# Patient Record
Sex: FEMALE | Race: WHITE | NOT HISPANIC OR LATINO
[De-identification: names, ages, dates, MRNs, and addresses within clinical notes are randomized per-mention and may not be internally consistent; named-entity substitution may affect disease eponyms.]

---

## 2018-12-10 ENCOUNTER — APPOINTMENT (OUTPATIENT)
Dept: PULMONOLOGY | Facility: CLINIC | Age: 78
End: 2018-12-10
Payer: MEDICARE

## 2018-12-10 VITALS
WEIGHT: 205 LBS | DIASTOLIC BLOOD PRESSURE: 82 MMHG | HEIGHT: 66 IN | OXYGEN SATURATION: 98 % | HEART RATE: 53 BPM | SYSTOLIC BLOOD PRESSURE: 151 MMHG | TEMPERATURE: 98.7 F | RESPIRATION RATE: 16 BRPM | BODY MASS INDEX: 32.95 KG/M2

## 2018-12-10 DIAGNOSIS — R06.09 OTHER FORMS OF DYSPNEA: ICD-10-CM

## 2018-12-10 PROCEDURE — 94060 EVALUATION OF WHEEZING: CPT

## 2018-12-10 PROCEDURE — 94726 PLETHYSMOGRAPHY LUNG VOLUMES: CPT

## 2018-12-10 PROCEDURE — 99204 OFFICE O/P NEW MOD 45 MIN: CPT | Mod: 25

## 2018-12-10 PROCEDURE — 94729 DIFFUSING CAPACITY: CPT

## 2018-12-10 PROCEDURE — ZZZZZ: CPT

## 2019-01-09 ENCOUNTER — FORM ENCOUNTER (OUTPATIENT)
Age: 79
End: 2019-01-09

## 2019-01-10 ENCOUNTER — OUTPATIENT (OUTPATIENT)
Dept: OUTPATIENT SERVICES | Facility: HOSPITAL | Age: 79
LOS: 1 days | End: 2019-01-10
Payer: COMMERCIAL

## 2019-01-10 ENCOUNTER — APPOINTMENT (OUTPATIENT)
Dept: CT IMAGING | Facility: CLINIC | Age: 79
End: 2019-01-10
Payer: MEDICARE

## 2019-01-10 DIAGNOSIS — Z90.710 ACQUIRED ABSENCE OF BOTH CERVIX AND UTERUS: Chronic | ICD-10-CM

## 2019-01-10 DIAGNOSIS — Z00.8 ENCOUNTER FOR OTHER GENERAL EXAMINATION: ICD-10-CM

## 2019-01-10 DIAGNOSIS — Z90.49 ACQUIRED ABSENCE OF OTHER SPECIFIED PARTS OF DIGESTIVE TRACT: Chronic | ICD-10-CM

## 2019-01-10 PROCEDURE — 71250 CT THORAX DX C-: CPT

## 2019-01-10 PROCEDURE — 71250 CT THORAX DX C-: CPT | Mod: 26

## 2020-06-30 ENCOUNTER — APPOINTMENT (OUTPATIENT)
Dept: PEDIATRIC ALLERGY IMMUNOLOGY | Facility: CLINIC | Age: 80
End: 2020-06-30
Payer: MEDICARE

## 2020-06-30 DIAGNOSIS — E03.9 HYPOTHYROIDISM, UNSPECIFIED: ICD-10-CM

## 2020-06-30 DIAGNOSIS — Z86.79 PERSONAL HISTORY OF OTHER DISEASES OF THE CIRCULATORY SYSTEM: ICD-10-CM

## 2020-06-30 DIAGNOSIS — Z86.59 PERSONAL HISTORY OF OTHER MENTAL AND BEHAVIORAL DISORDERS: ICD-10-CM

## 2020-06-30 DIAGNOSIS — Z86.69 PERSONAL HISTORY OF OTHER DISEASES OF THE NERVOUS SYSTEM AND SENSE ORGANS: ICD-10-CM

## 2020-06-30 DIAGNOSIS — L50.1 IDIOPATHIC URTICARIA: ICD-10-CM

## 2020-06-30 PROCEDURE — 99203 OFFICE O/P NEW LOW 30 MIN: CPT

## 2020-06-30 NOTE — HISTORY OF PRESENT ILLNESS
[Asthma] : asthma [Allergic Rhinitis] : allergic rhinitis [de-identified] : 79 yr old previously followed by allergist at [facility] now is seen for consult.  Pt was well until 12/19 when she noted sudden onset urticaria and angioedema.  Hives were diffuse and associated with angioedema of lips, eyes, some dysphagia and dysphonia.  She was seen in MD office and given shot of steroids, PO steroids and Benadryl. symptoms went away but over the next few months from 1/20 to 6/20 pt has noticed episodes hives and mild angioedema about 3-4x/week. Complaints appear to be somewhat random and may be associated with foods but is non consistent.  Pt went to allergist, was skin tested and found to have low grade positive skin tests to several airborne allergens but these were not associated with flares of hives.  RASTs were also done to airborne allergens, fish, shellfish, peanut, tree nuts and were essentially negative. Pt however was told to avoid nuts and fish for ?? reason.  Pt has been avoiding tomato but is not clear this was associated with any hives.  Pt has underlying history of hypothyroidism and is on replacement therapy.  Several courses of oral steroids have been required since 1/20 to control hives.  Pt has not been using chronic H1 antihistamine therapy to supress hives but just chronic H2 antihistamine therapy.

## 2020-06-30 NOTE — PHYSICAL EXAM
[Alert] : alert [No Acute Distress] : no acute distress [Normal Pupil & Iris Size/Symmetry] : normal pupil and iris size and symmetry [Normal Lips/Tongue] : the lips and tongue were normal [Normal TMs] : both tympanic membranes were normal [No Thrush] : no thrush [Normal Rate and Effort] : normal respiratory rhythm and effort [No Crackles] : no crackles [Bilateral Audible Breath Sounds] : bilateral audible breath sounds [Normal Rate] : heart rate was normal  [Normal Cervical Lymph Nodes] : cervical [Regular Rhythm] : with a regular rhythm [Skin Intact] : skin intact  [de-identified] : Vitiligo, no evidnce  of urticaria or angioedema [Normal Mood] : mood was normal

## 2020-06-30 NOTE — REVIEW OF SYSTEMS
[Chest Pain] : chest pain  or discomfort [Puffy Eyelids] : puffy ~T eyelids [Pruritus] : pruritus [Urticaria] : urticaria [Swelling] : swelling [Depression] : depression [Nl] : Hematologic/Lymphatic [de-identified] : Peg

## 2020-06-30 NOTE — ASSESSMENT
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  Although previous allergy evaluation suggested some sort of underlying atopic cause to pt hives,none could be found by history or review of previous allergy evaluation.  As in most situation, etiology is likely to be idiopathic. Pt has only been taking episodic H1 blockers and continues to flare past several months with 3 oral courses of steroids being required.   Previous use of Zyrtec and Xyzal caused sedation. Pt destin start Allegra 180 mg qd-bid as trial.  Labs sent for evaluation\par \par Inder Stout MD, FAAP, FAAAAI\par [facility] Ast[facility]ology\par [facility]\par [facility]\par [facility] [place]\par [address], [address] [facility] [address], [place]  [address]\par [address] [place] [address], [address]  [address]\par [phone]\par

## 2020-06-30 NOTE — REASON FOR VISIT
[Initial Consultation] : an initial consultation for [To Food] : allergy to food [Hives] : hives [Angioedema] : angioedema

## 2020-07-06 LAB
ALBUMIN SERPL ELPH-MCNC: 4 G/DL
ALP BLD-CCNC: 113 U/L
ALT SERPL-CCNC: 12 U/L
ANION GAP SERPL CALC-SCNC: 9 MMOL/L
AST SERPL-CCNC: 21 U/L
BASOPHILS # BLD AUTO: 0.01 K/UL
BASOPHILS NFR BLD AUTO: 0.1 %
BILIRUB SERPL-MCNC: 0.6 MG/DL
BUN SERPL-MCNC: 18 MG/DL
CALCIUM SERPL-MCNC: 8.8 MG/DL
CHLORIDE SERPL-SCNC: 108 MMOL/L
CO2 SERPL-SCNC: 25 MMOL/L
CREAT SERPL-MCNC: 0.75 MG/DL
EOSINOPHIL # BLD AUTO: 0.27 K/UL
EOSINOPHIL NFR BLD AUTO: 3.3 %
ERYTHROCYTE [SEDIMENTATION RATE] IN BLOOD BY WESTERGREN METHOD: 18 MM/HR
GLUCOSE SERPL-MCNC: 83 MG/DL
HCT VFR BLD CALC: 42.3 %
HGB BLD-MCNC: 12.7 G/DL
IMM GRANULOCYTES NFR BLD AUTO: 0.4 %
LYMPHOCYTES # BLD AUTO: 1.9 K/UL
LYMPHOCYTES NFR BLD AUTO: 23.3 %
MAN DIFF?: NORMAL
MCHC RBC-ENTMCNC: 27.4 PG
MCHC RBC-ENTMCNC: 30 GM/DL
MCV RBC AUTO: 91.2 FL
MONOCYTES # BLD AUTO: 0.68 K/UL
MONOCYTES NFR BLD AUTO: 8.4 %
NEUTROPHILS # BLD AUTO: 5.25 K/UL
NEUTROPHILS NFR BLD AUTO: 64.5 %
PLATELET # BLD AUTO: 238 K/UL
POTASSIUM SERPL-SCNC: 4.5 MMOL/L
PROT SERPL-MCNC: 5.9 G/DL
RBC # BLD: 4.64 M/UL
RBC # FLD: 13.8 %
SODIUM SERPL-SCNC: 142 MMOL/L
T4 SERPL-MCNC: 8.3 UG/DL
WBC # FLD AUTO: 8.14 K/UL

## 2020-10-06 ENCOUNTER — APPOINTMENT (OUTPATIENT)
Dept: PEDIATRIC ALLERGY IMMUNOLOGY | Facility: CLINIC | Age: 80
End: 2020-10-06
Payer: MEDICARE

## 2020-10-06 VITALS — HEART RATE: 57 BPM | OXYGEN SATURATION: 97 % | RESPIRATION RATE: 16 BRPM

## 2020-10-06 PROCEDURE — 99213 OFFICE O/P EST LOW 20 MIN: CPT

## 2020-10-06 NOTE — HISTORY OF PRESENT ILLNESS
[de-identified] : 79 yr old with chronic urticaria for the past year.  She was previously evaluated at another allergist where shin tests revealed presumptive irrelevant skin tests.  Evaluation to date had included a normal CBC, ESR,. T4, CMP.  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 Pt was seen in late June and started on Allegra 180 mg qAM along with Pepcid 20 mg qpm. She is now about 80% better but still has AM and PM hives.  She also has vitiligo and eczema/xerosis

## 2020-10-06 NOTE — ASSESSMENT
[FreeTextEntry1] : 79 yr old with CIU with significant improvement with Allegra 180 mg qd but still mild AM and PM symptoms.\par \par Suggest increasing Allegra to 180 mg bid along with Pepcid 20 mg qhs. Skin care routines were discussed. Pt has already received Influenza vaccine at primary care MD .\par \par Will follow up 4-6 months\par \par Inder Stout MD, FAAP, FAAAAI\par Pediatric [facility] Adult [facility] Ast[facility], [facility] Immunology\par [facility]\par [facility]\par [facility] at [place]\par [address], [address], [place]  [address]\par [address] [place] [address], [address], [place]  [address]\par [phone]\par

## 2020-11-30 ENCOUNTER — APPOINTMENT (OUTPATIENT)
Dept: PULMONOLOGY | Facility: CLINIC | Age: 80
End: 2020-11-30
Payer: MEDICARE

## 2020-11-30 VITALS
TEMPERATURE: 98.1 F | HEART RATE: 86 BPM | SYSTOLIC BLOOD PRESSURE: 144 MMHG | HEIGHT: 66 IN | BODY MASS INDEX: 34.55 KG/M2 | RESPIRATION RATE: 15 BRPM | OXYGEN SATURATION: 97 % | DIASTOLIC BLOOD PRESSURE: 80 MMHG | WEIGHT: 215 LBS

## 2020-11-30 PROCEDURE — 99214 OFFICE O/P EST MOD 30 MIN: CPT

## 2020-11-30 PROCEDURE — 99072 ADDL SUPL MATRL&STAF TM PHE: CPT

## 2020-11-30 NOTE — PHYSICAL EXAM
[General Appearance - Well Developed] : well developed [Normal Appearance] : normal appearance [General Appearance - Well Nourished] : well nourished [No Deformities] : no deformities [Normal Conjunctiva] : the conjunctiva exhibited no abnormalities [Low Lying Soft Palate] : low lying soft palate [Enlarged Base of the Tongue] : enlargement of the base of the tongue [III] : III [Heart Rate And Rhythm] : heart rate was normal and rhythm regular [Heart Sounds] : normal S1 and S2 [Murmurs] : no murmurs [] : no respiratory distress [Respiration, Rhythm And Depth] : normal respiratory rhythm and effort [Auscultation Breath Sounds / Voice Sounds] : lungs were clear to auscultation bilaterally [Abnormal Walk] : normal gait [Musculoskeletal - Swelling] : no joint swelling seen [Nail Clubbing] : no clubbing of the fingernails [Cyanosis, Localized] : no localized cyanosis [Skin Color & Pigmentation] : normal skin color and pigmentation [Skin Turgor] : normal skin turgor [No Focal Deficits] : no focal deficits [Oriented To Time, Place, And Person] : oriented to person, place, and time [Impaired Insight] : insight and judgment were intact [Affect] : the affect was normal [Normal Oropharynx] : abnormal oropharynx [Elongated Uvula] : no elongated uvula [Erythema] : no erythema of the pharynx [Retrognathia] : no retrognathia [Micrognathia] : no micrognathia [Neck Appearance] : the appearance of the neck was normal [FreeTextEntry2] : no LE edema

## 2020-11-30 NOTE — HISTORY OF PRESENT ILLNESS
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 She has been on CPAP since. Last visit was in 2018. After last visit, humidifier was broken and got replaced but has since broken again. Her DME supplier is [facility] and has been receiving new supplies. She is using a full face mask and tolerating without issues. She feels that she is waking up more frequently as of late. \par She has gained about 10 lbs since the last visit. \par \par No longer reporting dyspnea on exertion. Attributes her episode last visit due to stress.\par \par

## 2020-11-30 NOTE — ASSESSMENT
[FreeTextEntry1] : 77 yo female with severe KASIA on CPAP here for a follow up visit. Therapeutic and compliance data download reveals usage 100% of days with an average use of 9 hours and 24 minutes. Therapy based AHI is 0.9/hr on 14 cm H2O. The patient is experiencing benefit from CPAP and should continue to use. She feels that she is waking up more. Though AHI is within normal limits, she did have weight gain so pressure was empirically raised to 15 cm H2O. Follow up CT chest for pulmonary nodules. \par \par Follow up in 1 month.\par

## 2020-11-30 NOTE — REVIEW OF SYSTEMS
[Thyroid Disease] : thyroid disease [Difficulty Maintaining Sleep] : difficulty maintaining sleep [Negative] : Psychiatric [EDS: ESS=____] : daytime somnolence: ESS=[unfilled] [Fatigue] : no fatigue [Recent Wt Gain (___ Lbs)] : no recent weight gain [Nasal Congestion] : no nasal congestion [Snoring] : no snoring [Witnessed Apneas] : no witnessed apnea [Shortness Of Breath] : no shortness of breath [A.M. Dry Mouth] : no a.m. dry mouth [Chest Pain] : no chest pain [Obesity] : not obese [Diabetes] : no diabetes  [Anemia] : no anemia [A.M. Headache] : no headache present upon awakening [Heartburn] : no heartburn [Nocturia] : no nocturia [Difficulty Initiating Sleep] : no difficulty falling asleep [Lower Extremity Discomfort] : no lower extremity discomfort [Irresistible urge to move legs] : no irresistible urge to move legs because of lower extremity discomfort [LE discomfort relieved by movement] : lower extremity discomfort not relieved by movement [Late day/ Evening symptoms] : no late day/evening symptoms [Sleep Disturbances due to LE symptoms] : ~T no sleep disturbances due to lower extremity symptoms [Unusual Sleep Behavior] : no unusual sleep behavior

## 2020-12-18 ENCOUNTER — OUTPATIENT (OUTPATIENT)
Dept: OUTPATIENT SERVICES | Facility: HOSPITAL | Age: 80
LOS: 1 days | End: 2020-12-18
Payer: COMMERCIAL

## 2020-12-18 ENCOUNTER — APPOINTMENT (OUTPATIENT)
Dept: CT IMAGING | Facility: CLINIC | Age: 80
End: 2020-12-18
Payer: MEDICARE

## 2020-12-18 DIAGNOSIS — Z00.00 ENCOUNTER FOR GENERAL ADULT MEDICAL EXAMINATION WITHOUT ABNORMAL FINDINGS: ICD-10-CM

## 2020-12-18 DIAGNOSIS — Z90.49 ACQUIRED ABSENCE OF OTHER SPECIFIED PARTS OF DIGESTIVE TRACT: Chronic | ICD-10-CM

## 2020-12-18 DIAGNOSIS — Z90.710 ACQUIRED ABSENCE OF BOTH CERVIX AND UTERUS: Chronic | ICD-10-CM

## 2020-12-18 PROCEDURE — 71250 CT THORAX DX C-: CPT | Mod: 26

## 2020-12-18 PROCEDURE — 71250 CT THORAX DX C-: CPT

## 2020-12-30 ENCOUNTER — NON-APPOINTMENT (OUTPATIENT)
Age: 80
End: 2020-12-30

## 2021-01-04 ENCOUNTER — APPOINTMENT (OUTPATIENT)
Dept: PULMONOLOGY | Facility: CLINIC | Age: 81
End: 2021-01-04
Payer: MEDICARE

## 2021-01-04 PROCEDURE — 99214 OFFICE O/P EST MOD 30 MIN: CPT | Mod: 95

## 2021-01-04 NOTE — ASSESSMENT
[FreeTextEntry1] : 81 yo female with severe KASIA on CPAP here for a follow up visit. Therapeutic and compliance data download reveals usage 100% of days with an average use of 9 hours and 34 minutes. Therapy based AHI is 0.6/hr on 15 cm H2O. Last visit, pressure was empirically increased to 15 cm H2O but feels that it is too high. Will lower back down to 14 cm H2O. Contacted [facility] regarding supply issue. Residual sleepiness may be related to stressors related to family member's health or suboptimally controlled depression. She will follow up with PMD regarding depression. Reviewed CT chest results with patient. Can follow up CT chest in 1 year.\par \par Follow up in 1 month for repeat data download.\par

## 2021-01-04 NOTE — HISTORY OF PRESENT ILLNESS
[Home] : at home, [unfilled] , at the time of the visit. 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 She has been on CPAP since. Last visit, pressure empirically raised to 15 cm H2O but she feels this pressure is too high. She is using a full face mask and notes leak at times. DME [facility]. She has not received re-supplies in several months. She sleeps 9.5 hours but still feels tired during the day. Unsure if it's related to KASIA or stressors ongoing at home.\par \par Currently dealing with stressors at home. Daughter hospitalized last week and she also takes care of  who is legally blind. Zoloft dosing was recently adjusted by PCP.

## 2021-01-04 NOTE — REVIEW OF SYSTEMS
[EDS: ESS=____] : daytime somnolence: ESS=[unfilled] [Thyroid Disease] : thyroid disease [Depression] : depression [Difficulty Maintaining Sleep] : difficulty maintaining sleep [Negative] : Musculoskeletal [Fatigue] : no fatigue [Recent Wt Gain (___ Lbs)] : no recent weight gain [Nasal Congestion] : no nasal congestion [Snoring] : no snoring [Witnessed Apneas] : no witnessed apnea [Shortness Of Breath] : no shortness of breath [A.M. Dry Mouth] : no a.m. dry mouth [Chest Pain] : no chest pain [Obesity] : not obese [Diabetes] : no diabetes  [Anemia] : no anemia [A.M. Headache] : no headache present upon awakening [Heartburn] : no heartburn [Nocturia] : no nocturia [Difficulty Initiating Sleep] : no difficulty falling asleep [Lower Extremity Discomfort] : no lower extremity discomfort [Irresistible urge to move legs] : no irresistible urge to move legs because of lower extremity discomfort [LE discomfort relieved by movement] : lower extremity discomfort not relieved by movement [Late day/ Evening symptoms] : no late day/evening symptoms [Sleep Disturbances due to LE symptoms] : ~T no sleep disturbances due to lower extremity symptoms [Unusual Sleep Behavior] : no unusual sleep behavior

## 2021-02-09 ENCOUNTER — APPOINTMENT (OUTPATIENT)
Dept: PEDIATRIC ALLERGY IMMUNOLOGY | Facility: CLINIC | Age: 81
End: 2021-02-09

## 2021-02-10 ENCOUNTER — APPOINTMENT (OUTPATIENT)
Dept: PULMONOLOGY | Facility: CLINIC | Age: 81
End: 2021-02-10
Payer: MEDICARE

## 2021-02-10 DIAGNOSIS — G47.33 OBSTRUCTIVE SLEEP APNEA (ADULT) (PEDIATRIC): ICD-10-CM

## 2021-02-10 PROCEDURE — 99214 OFFICE O/P EST MOD 30 MIN: CPT | Mod: 95

## 2021-02-10 RX ORDER — ATORVASTATIN CALCIUM 40 MG/1
40 TABLET, FILM COATED ORAL
Qty: 90 | Refills: 0 | Status: ACTIVE | COMMUNITY
Start: 2020-09-02

## 2021-02-10 NOTE — HISTORY OF PRESENT ILLNESS
[Home] : at home, [unfilled] , at the time of the visit. 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 She has been on CPAP since. Last visit, pressure empirically lowered back down to 14 cm H2O. She is using a full face mask and notes leak at times. DME is [facility]. Resupply issue has been resolved. \par \par

## 2021-02-10 NOTE — ASSESSMENT
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 Therapeutic and compliance data download reveals usage 100% of days with an average use of 9 hours and 37 minutes. Therapy based AHI is 1/hr on 14 cm H2O.  The patient is experiencing benefit from CPAP and should continue to use. \par  \par \par Follow up in 8 months. Repeat CT chest at that time. \par

## 2021-02-22 ENCOUNTER — APPOINTMENT (OUTPATIENT)
Dept: PEDIATRIC ALLERGY IMMUNOLOGY | Facility: CLINIC | Age: 81
End: 2021-02-22
Payer: MEDICARE

## 2021-02-22 VITALS
HEART RATE: 56 BPM | DIASTOLIC BLOOD PRESSURE: 72 MMHG | WEIGHT: 200 LBS | OXYGEN SATURATION: 96 % | RESPIRATION RATE: 22 BRPM | HEIGHT: 66 IN | SYSTOLIC BLOOD PRESSURE: 168 MMHG | BODY MASS INDEX: 32.14 KG/M2

## 2021-02-22 PROCEDURE — 99213 OFFICE O/P EST LOW 20 MIN: CPT

## 2021-02-22 PROCEDURE — 99072 ADDL SUPL MATRL&STAF TM PHE: CPT

## 2021-02-22 RX ORDER — FEXOFENADINE HYDROCHLORIDE 180 MG/1
180 TABLET, FILM COATED ORAL
Refills: 0 | Status: ACTIVE | COMMUNITY

## 2021-02-22 RX ORDER — TRIAMCINOLONE ACETONIDE 1 MG/G
0.1 CREAM TOPICAL
Qty: 454 | Refills: 0 | Status: DISCONTINUED | COMMUNITY
Start: 2020-09-12 | End: 2021-02-22

## 2021-02-22 NOTE — SOCIAL HISTORY
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

## 2021-02-22 NOTE — REASON FOR VISIT
[Routine Follow-Up] : a routine follow-up visit for [Allergy Evaluation/ Skin Testing] : allergy evaluation and or skin testing [Hives] : hives

## 2021-02-22 NOTE — HISTORY OF PRESENT ILLNESS
[de-identified] : 80 yr old with CIU doing very well with hives. Over the past few months pt has tried to stop both her Allegra and Pepcid from bid to qd with increase hives after a few days. If she is on both of them bid, she is free of hives.

## 2021-02-22 NOTE — ASSESSMENT
[FreeTextEntry1] : 80 yr old with chronic urticaria doing well with meds. Pt tried to stop her H1 and H2 blockers and noted increase hives. \par OK to restart bid meds\par Will follow up 6 months\par \par

## 2021-02-22 NOTE — PHYSICAL EXAM
[Well Nourished] : well nourished [No Discharge] : no discharge [Normal TMs] : both tympanic membranes were normal [No Thrush] : no thrush [Normal Rate and Effort] : normal respiratory rhythm and effort [No Crackles] : no crackles [Wheezing] : no wheezing was heard [Normal Rate] : heart rate was normal  [Normal Cervical Lymph Nodes] : cervical [Patches] : ~M patches present [Xerosis] : xerosis [Excoriated] : excoriated [Lichenifcation] : lichenifcation

## 2021-05-13 ENCOUNTER — APPOINTMENT (OUTPATIENT)
Dept: PEDIATRIC ALLERGY IMMUNOLOGY | Facility: CLINIC | Age: 81
End: 2021-05-13
Payer: MEDICARE

## 2021-05-13 VITALS — RESPIRATION RATE: 20 BRPM | OXYGEN SATURATION: 95 % | HEART RATE: 57 BPM

## 2021-05-13 PROCEDURE — 99072 ADDL SUPL MATRL&STAF TM PHE: CPT

## 2021-05-13 PROCEDURE — 99213 OFFICE O/P EST LOW 20 MIN: CPT

## 2021-05-13 NOTE — ASSESSMENT
[FreeTextEntry1] : 80 yr old with CIU/MATILDE now with breakthrough complaints with increase stress\par with some angioedema\par \par Suggest increase Allegra 180/360 mg bid and continue Pepcid\par Pt to call next week\par \par Total MD time spent on this encounter was 20 minutes.  This includes time devoted to preparing to see the patient with review of previous medical record, obtaining medical history, performing physical exam, counseling and patient education with patient and family, ordering medications and lab studies, documentation in the medical record and coordination of care.\par

## 2021-05-13 NOTE — PHYSICAL EXAM
[Alert] : alert [Well Nourished] : well nourished [No Discharge] : no discharge [Normal TMs] : both tympanic membranes were normal [No Thrush] : no thrush [Boggy Nasal Turbinates] : no boggy and/or pale nasal turbinates [Posterior Pharyngeal Cobblestoning] : no posterior pharyngeal cobblestoning [Normal Rate and Effort] : normal respiratory rhythm and effort [No Crackles] : no crackles [Wheezing] : no wheezing was heard [Normal Rate] : heart rate was normal  [Normal S1, S2] : normal S1 and S2 [Skin Intact] : skin intact

## 2021-05-13 NOTE — HISTORY OF PRESENT ILLNESS
[de-identified] : 80 yr old with CIU was doing very well with hives on Allegra 180 mg bid and famotidine 20 mg bid. . In the past few weeks she has been under a great deal of stress with loss of family members and has noted increase hives despite taking these medications. There has been one episode of Lt poppy-orbital eye swelling and one episode of changes in vocal quality with some throat tightness but no respiratory distress.

## 2021-06-01 ENCOUNTER — NON-APPOINTMENT (OUTPATIENT)
Age: 81
End: 2021-06-01

## 2021-07-07 ENCOUNTER — APPOINTMENT (OUTPATIENT)
Dept: PEDIATRIC ALLERGY IMMUNOLOGY | Facility: CLINIC | Age: 81
End: 2021-07-07
Payer: MEDICARE

## 2021-07-07 DIAGNOSIS — I10 ESSENTIAL (PRIMARY) HYPERTENSION: ICD-10-CM

## 2021-07-07 PROCEDURE — 99214 OFFICE O/P EST MOD 30 MIN: CPT

## 2021-07-07 RX ORDER — EPINEPHRINE 0.3 MG/.3ML
0.3 INJECTION INTRAMUSCULAR
Qty: 2 | Refills: 1 | Status: ACTIVE | COMMUNITY
Start: 2021-07-07 | End: 1900-01-01

## 2021-07-07 NOTE — ASSESSMENT
[FreeTextEntry1] : 80 yr old with 1 1/2 yr history of MATILDE/CIU now with increasing complaints past few weeks after increase stress in family and ?? new BP meds given by primary care MD - pt to call me back with name of medication- If ACE inh will have to change\par \par Last eval in 2020 was normal but will do more extensive evaluation now\par \par Continue Allegra 320/180 mg bid and Pepcid 20 mg bid\par Start Prednisone 50 mg now and then 40 mg qd for 4 days and then taper\par Consider Xolair if eval is normal\par Pt to call next few days or sooner if no better\par Will give Epi Pen to carry

## 2021-07-07 NOTE — REASON FOR VISIT
[Routine Follow-Up] : a routine follow-up visit for [Hives] : hives [Angioedema] : angioedema [Family Member] : family member

## 2021-07-07 NOTE — PHYSICAL EXAM
[Alert] : alert [Well Nourished] : well nourished [No Discharge] : no discharge [Normal TMs] : both tympanic membranes were normal [Normal Nasal Mucosa] : the nasal mucosa was normal [No Thrush] : no thrush [Pale mucosa] : no pale mucosa [Boggy Nasal Turbinates] : no boggy and/or pale nasal turbinates [Pharyngeal erythema] : no pharyngeal erythema [Posterior Pharyngeal Cobblestoning] : no posterior pharyngeal cobblestoning [Clear Rhinorrhea] : no clear rhinorrhea was seen [No Neck Mass] : no neck mass was observed [Normal Rate and Effort] : normal respiratory rhythm and effort [No Crackles] : no crackles [Wheezing] : no wheezing was heard [Normal Rate] : heart rate was normal  [Normal Cervical Lymph Nodes] : cervical [de-identified] : Minimal lip swelling - upper - no tongue or uvular swelling [de-identified] : Mild angioedema of lower extremities no urticaria today

## 2021-07-07 NOTE — REVIEW OF SYSTEMS
[Puffy Eyelids] : puffy ~T eyelids [Swollen Eyelids] : ~T ~L swollen eyelids [Hoarseness] : hoarseness [Urticaria] : urticaria [Swelling] : swelling [Nl] : Respiratory [FreeTextEntry4] : Throat tightness

## 2021-07-07 NOTE — HISTORY OF PRESENT ILLNESS
[de-identified] : 80 yr old with CIU was doing very well with hives on Allegra 180 mg bid and famotidine 20 mg bid. . In the past few weeks she has been under a great deal of stress with loss of family members and has noted increase hives and now increase angioedema despite taking these medications. Past 24 hrs pt complained of bilateral poppy-orbital swelling, mild lip swelling and some throat tightens, change in vocal quality and dysphagia. No shortness of breath. \par Pt was started on new BP med but does not know its name - she will call with name - if ACE Inh - will need to stop

## 2021-07-10 LAB
ALBUMIN SERPL ELPH-MCNC: 4.2 G/DL
ALP BLD-CCNC: 113 U/L
ALT SERPL-CCNC: 13 U/L
ANION GAP SERPL CALC-SCNC: 11 MMOL/L
AST SERPL-CCNC: 17 U/L
BASOPHILS # BLD AUTO: 0.03 K/UL
BASOPHILS NFR BLD AUTO: 0.2 %
BILIRUB SERPL-MCNC: 0.2 MG/DL
BUN SERPL-MCNC: 20 MG/DL
C4 SERPL-MCNC: 40 MG/DL
CALCIUM SERPL-MCNC: 9.6 MG/DL
CHLORIDE SERPL-SCNC: 106 MMOL/L
CO2 SERPL-SCNC: 26 MMOL/L
CREAT SERPL-MCNC: 0.83 MG/DL
EOSINOPHIL # BLD AUTO: 0.06 K/UL
EOSINOPHIL NFR BLD AUTO: 0.3 %
ERYTHROCYTE [SEDIMENTATION RATE] IN BLOOD BY WESTERGREN METHOD: 12 MM/HR
GLUCOSE SERPL-MCNC: 85 MG/DL
HCT VFR BLD CALC: 43.7 %
HGB BLD-MCNC: 13.3 G/DL
IMM GRANULOCYTES NFR BLD AUTO: 0.9 %
LYMPHOCYTES # BLD AUTO: 1.55 K/UL
LYMPHOCYTES NFR BLD AUTO: 8.4 %
MAN DIFF?: NORMAL
MCHC RBC-ENTMCNC: 27.5 PG
MCHC RBC-ENTMCNC: 30.4 GM/DL
MCV RBC AUTO: 90.5 FL
MONOCYTES # BLD AUTO: 0.94 K/UL
MONOCYTES NFR BLD AUTO: 5.1 %
NEUTROPHILS # BLD AUTO: 15.75 K/UL
NEUTROPHILS NFR BLD AUTO: 85.1 %
PLATELET # BLD AUTO: 304 K/UL
POTASSIUM SERPL-SCNC: 4.4 MMOL/L
PROT SERPL-MCNC: 6.5 G/DL
RBC # BLD: 4.83 M/UL
RBC # FLD: 13 %
SODIUM SERPL-SCNC: 144 MMOL/L
T4 SERPL-MCNC: 7.9 UG/DL
THYROGLOB AB SERPL-ACNC: <20 IU/ML
THYROPEROXIDASE AB SERPL IA-ACNC: <10 IU/ML
TSH SERPL-ACNC: 3.17 UIU/ML
WBC # FLD AUTO: 18.5 K/UL

## 2021-07-14 LAB
C1INH FUNCTIONAL FLD-MCNC: >91
C1INH SERPL-MCNC: 43 MG/DL
CHRONIC URTICARIA PANEL (CU INDEX): >50
TRYPTASE: 19.5 UG/L

## 2021-07-15 LAB — IGE AB SERPL QL: 17 NG/ML

## 2021-08-01 ENCOUNTER — NON-APPOINTMENT (OUTPATIENT)
Age: 81
End: 2021-08-01

## 2021-08-01 LAB
IGE RECEPTOR AB INTERPRETATION: NORMAL
IGE RECEPTOR AB: 35.4 %
IGE RECEPTOR COMMENT: NORMAL

## 2021-08-10 ENCOUNTER — APPOINTMENT (OUTPATIENT)
Dept: PEDIATRIC ALLERGY IMMUNOLOGY | Facility: CLINIC | Age: 81
End: 2021-08-10
Payer: MEDICARE

## 2021-08-10 VITALS — TEMPERATURE: 97.2 F | BODY MASS INDEX: 36.49 KG/M2 | HEIGHT: 65 IN | WEIGHT: 219 LBS

## 2021-08-10 DIAGNOSIS — R74.8 ABNORMAL LEVELS OF OTHER SERUM ENZYMES: ICD-10-CM

## 2021-08-10 DIAGNOSIS — L85.3 XEROSIS CUTIS: ICD-10-CM

## 2021-08-10 PROCEDURE — 99213 OFFICE O/P EST LOW 20 MIN: CPT

## 2021-08-10 RX ORDER — SERTRALINE HYDROCHLORIDE 100 MG/1
100 TABLET, FILM COATED ORAL
Refills: 0 | Status: ACTIVE | COMMUNITY

## 2021-08-10 RX ORDER — PREDNISONE 10 MG/1
10 TABLET ORAL
Qty: 40 | Refills: 0 | Status: DISCONTINUED | COMMUNITY
Start: 2021-07-07 | End: 2021-08-10

## 2021-08-10 NOTE — HISTORY OF PRESENT ILLNESS
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 However Tryptase was elevate at 19.5 - pt has no other signs of mastocytosis\par \par Pt has underlying history of vitiligo\par She remains on her atorvastatin, Synthroid, amlodipine, metoprolol, sertraline

## 2021-08-10 NOTE — SOCIAL HISTORY
[Spouse/Partner] : spouse/partner [College] : College [House] : [unfilled] lives in a house  [Radiator/Baseboard] : heating provided by radiator(s)/baseboard(s) [Window Units] : air conditioning provided by window units [Dust Mite Covers] : has dust mite covers [Bedroom] :  in bedroom [Living Area] : in living area [Dog] : dog [] :  [FreeTextEntry1] : 2 years [FreeTextEntry2] : reitired [Humidifier] : does not use a humidifier [Dehumidifier] : does not use a dehumidifier [Feather Pillows] : does not have feather pillows [Feather Comforter] : does not have a feather comforter [Smokers in Household] : there are no smokers in the home [de-identified] : quilting,crocheting

## 2021-08-10 NOTE — ASSESSMENT
[FreeTextEntry1] : 80 yr old with previous history of CIU/MATILDE now much better in remission on high dose Allegra\par Will begin to taper Allegra to 180 mg bid and cut Pepcid to qd\par \par Hx of elevated Tryptase - ?? etiology-may need to send to [facility] if still persistently positive\par Will send eval with c-kit and repeat all other students. If still elevated will consider eval with 24 hr urines.

## 2021-08-10 NOTE — PHYSICAL EXAM
[Alert] : alert [Well Nourished] : well nourished [No Discharge] : no discharge [Normal TMs] : both tympanic membranes were normal [Boggy Nasal Turbinates] : no boggy and/or pale nasal turbinates [Posterior Pharyngeal Cobblestoning] : no posterior pharyngeal cobblestoning [No Neck Mass] : no neck mass was observed [Normal Rate and Effort] : normal respiratory rhythm and effort [Normal Rate] : heart rate was normal  [Normal Cervical Lymph Nodes] : cervical

## 2021-10-04 ENCOUNTER — NON-APPOINTMENT (OUTPATIENT)
Age: 81
End: 2021-10-04

## 2021-10-04 LAB
ALBUMIN MFR SERPL ELPH: 57.7 %
ALBUMIN SERPL ELPH-MCNC: 4.1 G/DL
ALBUMIN SERPL-MCNC: 3.6 G/DL
ALBUMIN/GLOB SERPL: 1.3 RATIO
ALP BLD-CCNC: 107 U/L
ALPHA1 GLOB MFR SERPL ELPH: 4.7 %
ALPHA1 GLOB SERPL ELPH-MCNC: 0.3 G/DL
ALPHA2 GLOB MFR SERPL ELPH: 11.9 %
ALPHA2 GLOB SERPL ELPH-MCNC: 0.7 G/DL
ALT SERPL-CCNC: 15 U/L
ANION GAP SERPL CALC-SCNC: 11 MMOL/L
AST SERPL-CCNC: 17 U/L
B-GLOBULIN MFR SERPL ELPH: 13.6 %
B-GLOBULIN SERPL ELPH-MCNC: 0.9 G/DL
BASOPHILS # BLD AUTO: 0.02 K/UL
BASOPHILS NFR BLD AUTO: 0.2 %
BILIRUB SERPL-MCNC: 0.3 MG/DL
BUN SERPL-MCNC: 34 MG/DL
CALCIUM SERPL-MCNC: 9.9 MG/DL
CHLORIDE SERPL-SCNC: 109 MMOL/L
CO2 SERPL-SCNC: 23 MMOL/L
CREAT SERPL-MCNC: 0.89 MG/DL
CRP SERPL-MCNC: <3 MG/L
DEPRECATED KAPPA LC FREE/LAMBDA SER: 1.27 RATIO
EOSINOPHIL # BLD AUTO: 0.18 K/UL
EOSINOPHIL NFR BLD AUTO: 1.7 %
ERYTHROCYTE [SEDIMENTATION RATE] IN BLOOD BY WESTERGREN METHOD: 28 MM/HR
GAMMA GLOB FLD ELPH-MCNC: 0.8 G/DL
GAMMA GLOB MFR SERPL ELPH: 12.1 %
GLUCOSE SERPL-MCNC: 81 MG/DL
HCT VFR BLD CALC: 43.3 %
HGB BLD-MCNC: 13.5 G/DL
IGA SER QL IEP: 186 MG/DL
IGG SER QL IEP: 714 MG/DL
IGM SER QL IEP: 77 MG/DL
IMM GRANULOCYTES NFR BLD AUTO: 0.3 %
INTERPRETATION SERPL IEP-IMP: NORMAL
KAPPA LC CSF-MCNC: 1.1 MG/DL
KAPPA LC SERPL-MCNC: 1.4 MG/DL
LYMPHOCYTES # BLD AUTO: 1.87 K/UL
LYMPHOCYTES NFR BLD AUTO: 17.5 %
MAN DIFF?: NORMAL
MCHC RBC-ENTMCNC: 28.2 PG
MCHC RBC-ENTMCNC: 31.2 GM/DL
MCV RBC AUTO: 90.4 FL
MONOCYTES # BLD AUTO: 0.77 K/UL
MONOCYTES NFR BLD AUTO: 7.2 %
NEUTROPHILS # BLD AUTO: 7.84 K/UL
NEUTROPHILS NFR BLD AUTO: 73.1 %
PLATELET # BLD AUTO: 246 K/UL
POTASSIUM SERPL-SCNC: 5.5 MMOL/L
PROT SERPL-MCNC: 6.3 G/DL
RBC # BLD: 4.79 M/UL
RBC # FLD: 14.2 %
SODIUM SERPL-SCNC: 143 MMOL/L
TRYPTASE: 10.2 UG/L
VIT B12 SERPL-MCNC: 395 PG/ML
WBC # FLD AUTO: 10.71 K/UL

## 2021-10-05 LAB — CH50 SERPL-MCNC: 92 U/ML

## 2021-10-07 ENCOUNTER — APPOINTMENT (OUTPATIENT)
Dept: PEDIATRIC ALLERGY IMMUNOLOGY | Facility: CLINIC | Age: 81
End: 2021-10-07
Payer: MEDICARE

## 2021-10-07 VITALS — TEMPERATURE: 96.6 F | HEART RATE: 57 BPM | OXYGEN SATURATION: 96 % | RESPIRATION RATE: 18 BRPM

## 2021-10-07 DIAGNOSIS — L50.8 OTHER URTICARIA: ICD-10-CM

## 2021-10-07 DIAGNOSIS — T78.3XXA ANGIONEUROTIC EDEMA, INITIAL ENCOUNTER: ICD-10-CM

## 2021-10-07 PROCEDURE — 99213 OFFICE O/P EST LOW 20 MIN: CPT

## 2021-10-07 RX ORDER — SERTRALINE HYDROCHLORIDE 50 MG/1
50 TABLET, FILM COATED ORAL
Qty: 135 | Refills: 0 | Status: DISCONTINUED | COMMUNITY
Start: 2021-01-25 | End: 2021-10-07

## 2021-10-07 RX ORDER — FAMOTIDINE 20 MG/1
20 TABLET, FILM COATED ORAL TWICE DAILY
Refills: 0 | Status: DISCONTINUED | COMMUNITY
End: 2021-10-07

## 2021-10-07 NOTE — PHYSICAL EXAM
[Alert] : alert [Well Nourished] : well nourished [No Discharge] : no discharge [Normal TMs] : both tympanic membranes were normal [No Thrush] : no thrush [Boggy Nasal Turbinates] : no boggy and/or pale nasal turbinates [Posterior Pharyngeal Cobblestoning] : no posterior pharyngeal cobblestoning [No Neck Mass] : no neck mass was observed [Wheezing] : no wheezing was heard [Normal Rate] : heart rate was normal  [Normal S1, S2] : normal S1 and S2 [Normal Cervical Lymph Nodes] : cervical [Skin Intact] : skin intact  [Normal Mood] : mood was normal

## 2021-10-07 NOTE — ASSESSMENT
[FreeTextEntry1] : 80 yr old with history of CIU/MATILDE since 2019 now significantly better and in remission, with normalized blood work - no evidence of elevated Tryptase, and no meds for several weeks\par \par Pt to continue to observe for return of hives or angioedema and will follow up PRN\par \par Total MD time spent on this encounter was 20-29 minutes.  This includes time devoted to preparing to see the patient with review of previous medical record, obtaining medical history, performing physical exam, counseling and patient education with patient and family, ordering medications and lab studies, documentation in the medical record and coordination of care.\par

## 2021-10-07 NOTE — SOCIAL HISTORY
[Spouse/Partner] : spouse/partner [College] : College [House] : [unfilled] lives in a house  [Radiator/Baseboard] : heating provided by radiator(s)/baseboard(s) [Window Units] : air conditioning provided by window units [Dust Mite Covers] : has dust mite covers [Bedroom] :  in bedroom [Living Area] : in living area [Dog] : dog [] :  [FreeTextEntry1] : 2 years [FreeTextEntry2] : reitired [Humidifier] : does not use a humidifier [Dehumidifier] : does not use a dehumidifier [Feather Pillows] : does not have feather pillows [Feather Comforter] : does not have a feather comforter [Smokers in Household] : there are no smokers in the home [de-identified] : quilting,crocheting

## 2021-10-07 NOTE — HISTORY OF PRESENT ILLNESS
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

## 2021-10-11 LAB
C-KIT BACKGROUND: NORMAL
C-KIT DIRECTOR: NORMAL
C-KIT DISCLAIMER: NORMAL
C-KIT METHODOLOGY: NORMAL
C-KIT MUTATION ANALYSIS RESULT: NORMAL
C-KIT REFERENCE: NORMAL

## 2021-10-21 ENCOUNTER — APPOINTMENT (OUTPATIENT)
Dept: PULMONOLOGY | Facility: CLINIC | Age: 81
End: 2021-10-21

## 2022-10-13 PROBLEM — R74.8 ELEVATED SERUM TRYPTASE: Status: ACTIVE | Noted: 2021-08-10

## 2022-12-15 ENCOUNTER — NON-APPOINTMENT (OUTPATIENT)
Age: 82
End: 2022-12-15

## 2025-01-06 ENCOUNTER — NON-APPOINTMENT (OUTPATIENT)
Age: 85
End: 2025-01-06

## 2025-03-17 ENCOUNTER — APPOINTMENT (OUTPATIENT)
Dept: PULMONOLOGY | Facility: CLINIC | Age: 85
End: 2025-03-17

## 2025-03-25 ENCOUNTER — APPOINTMENT (OUTPATIENT)
Dept: PULMONOLOGY | Facility: CLINIC | Age: 85
End: 2025-03-25
Payer: MEDICARE

## 2025-03-25 DIAGNOSIS — G47.33 OBSTRUCTIVE SLEEP APNEA (ADULT) (PEDIATRIC): ICD-10-CM

## 2025-03-25 DIAGNOSIS — R91.8 OTHER NONSPECIFIC ABNORMAL FINDING OF LUNG FIELD: ICD-10-CM

## 2025-03-25 PROCEDURE — 99204 OFFICE O/P NEW MOD 45 MIN: CPT | Mod: 2W

## 2025-03-25 PROCEDURE — G2211 COMPLEX E/M VISIT ADD ON: CPT | Mod: 2W

## 2025-03-25 RX ORDER — AMLODIPINE BESYLATE 5 MG/1
TABLET ORAL
Refills: 0 | Status: ACTIVE | COMMUNITY

## 2025-04-14 ENCOUNTER — NON-APPOINTMENT (OUTPATIENT)
Age: 85
End: 2025-04-14

## 2025-06-13 ENCOUNTER — EMERGENCY (EMERGENCY)
Facility: HOSPITAL | Age: 85
LOS: 1 days | End: 2025-06-13
Attending: EMERGENCY MEDICINE | Admitting: EMERGENCY MEDICINE
Payer: COMMERCIAL

## 2025-06-13 ENCOUNTER — NON-APPOINTMENT (OUTPATIENT)
Age: 85
End: 2025-06-13

## 2025-06-13 VITALS
SYSTOLIC BLOOD PRESSURE: 170 MMHG | OXYGEN SATURATION: 95 % | TEMPERATURE: 98 F | DIASTOLIC BLOOD PRESSURE: 73 MMHG | RESPIRATION RATE: 18 BRPM | HEART RATE: 72 BPM

## 2025-06-13 DIAGNOSIS — Z90.710 ACQUIRED ABSENCE OF BOTH CERVIX AND UTERUS: Chronic | ICD-10-CM

## 2025-06-13 DIAGNOSIS — Z90.49 ACQUIRED ABSENCE OF OTHER SPECIFIED PARTS OF DIGESTIVE TRACT: Chronic | ICD-10-CM

## 2025-06-13 PROCEDURE — 99283 EMERGENCY DEPT VISIT LOW MDM: CPT | Mod: 25

## 2025-06-13 PROCEDURE — 70450 CT HEAD/BRAIN W/O DYE: CPT

## 2025-06-13 PROCEDURE — 73130 X-RAY EXAM OF HAND: CPT

## 2025-06-13 PROCEDURE — 73130 X-RAY EXAM OF HAND: CPT | Mod: 26,LT

## 2025-06-13 PROCEDURE — 99284 EMERGENCY DEPT VISIT MOD MDM: CPT | Mod: 25

## 2025-06-13 PROCEDURE — 29125 APPL SHORT ARM SPLINT STATIC: CPT | Mod: LT

## 2025-06-13 PROCEDURE — 70450 CT HEAD/BRAIN W/O DYE: CPT | Mod: 26

## 2025-06-13 PROCEDURE — 29130 APPL FINGER SPLINT STATIC: CPT

## 2025-06-13 NOTE — ED PROVIDER NOTE - PHYSICAL EXAMINATION
Gen: Alert, NAD  Head/eyes: NC/AT, PERRL  ENT: airway patent  Neck: supple  Pulm/lung: Bilateral clear BS  CV/heart: RRR  GI/Abd: soft, NT/ND, +BS, no guarding/rebound tenderness  Musculoskeletal: no edema/erythema/cyanosis, +ttp left thenar eminence, no deformity  Skin: no rash  Neuro: AAOx3, grossly intact

## 2025-06-13 NOTE — ED ADULT NURSE NOTE - CHIEF COMPLAINT QUOTE
pt tripped and felt  about 3 hours ago c/o lt thumb pain seen at [facility] and sent to Ed no loc not on blood thinner pt ambulating with steady gait

## 2025-06-13 NOTE — ED PROVIDER NOTE - CARE PLAN
1 Principal Discharge DX:	Left hand pain  Secondary Diagnosis:	Fall  Secondary Diagnosis:	Head injury

## 2025-06-13 NOTE — ED PROVIDER NOTE - PATIENT PORTAL LINK FT
You can access the FollowMyHealth Patient Portal offered by [facility] by registering at the following website: [email or web address]. By joining [facility]’s FollowMyHealth portal, you will also be able to view your health information using other applications (apps) compatible with our system.

## 2025-06-13 NOTE — ED PROVIDER NOTE - NSFOLLOWUPINSTRUCTIONS_ED_ALL_ED_FT
1) Follow-up with your Primary Medical Doctor and Dr. Estes. Call today / next business day for prompt follow-up.  2) Return to Emergency room for any worsening or persistent pain, weakness, fever, or any other concerning symptoms.  3) See attached instruction sheets for additional information, including information regarding signs and symptoms to look out for, reasons to seek immediate care and other important instructions.  4) Follow-up with any specialists as discussed / noted as well.  5)Take tylenol/advil every 6-8 hours as needed for pain.      Fall prevention includes ways to make your home and other areas safer. Prevention also includes ways you can move more carefully to prevent a fall. Health conditions that cause changes in your blood pressure, vision, or muscle strength and coordination may increase your risk for falls. Medicines may also increase your risk for falls if they make you dizzy, weak, or sleepy.    DISCHARGE INSTRUCTIONS:    Arrange to have someone call your local emergency number (911 in the ) if:    You have fallen and are found unconscious.    You have fallen and cannot move part of your body.  Call your doctor if:    You have fallen and have pain or a headache.    You have questions or concerns about your condition or care.  Fall prevention tips:    Stand or sit up slowly. This may help you keep your balance and prevent falls.    Use assistive devices as directed. Your healthcare provider may suggest that you use a cane or walker to help you keep your balance. You may need to have grab bars put in your bathroom near the toilet or in the shower.    Wear shoes that fit well and have soles that . Wear shoes both inside and outside. Use slippers with good . Do not wear shoes with high heels.    Stay active. Exercise can help strengthen your muscles and improve your balance. Your healthcare provider may recommend water aerobics or walking. He or she may also recommend physical therapy to improve your coordination. Never start an exercise program without talking to your healthcare provider first.   Family Walking for Exercise      Manage your medical conditions. Keep all appointments with your healthcare providers. Visit your eye doctor as directed.  Home safety tips:  Fall Prevention for Adults    Wear a personal alarm. This is a device that allows you to call for help if you fall. Ask your healthcare provider for more information.    Add items to prevent falls in the bathroom. Put nonslip strips on your bath or shower floor to prevent you from slipping. Use a bath mat if you do not have carpet in the bathroom. This will prevent you from falling when you step out of the bath or shower. Use a shower seat so you do not need to stand while you shower. Sit on the toilet or a chair in your bathroom to dry yourself and put on clothing. This will prevent you from losing your balance from drying or dressing yourself while you are standing.    Keep paths clear. Remove books, shoes, and other objects from walkways and stairs. Place cords for telephones and lamps out of the way so that you do not need to walk over them. Tape them down if you cannot move them. Remove small rugs. If you cannot remove a rug, secure it with double-sided tape. This will prevent you from tripping.    Install bright lights in your home. Use night lights to help light paths to the bathroom or kitchen. Always turn on the light before you start walking.    Keep items you use often on shelves within reach. Do not use a step stool to help you reach an item.    Paint or place reflective tape on the edges of your stairs. This will help you see the stairs better.  Plan ahead in case you do fall: Talk with family members, friends, and neighbors to create a fall plan. Someone will need to call for emergency help if you are injured or found unconscious. If possible, keep a mobile phone with you at all times, or wear an emergency alert device. You can contact emergency services by pressing a button on the device. Ask your healthcare provider for more information.    Follow up with your doctor as directed: Write down your questions so you remember to ask them during your visits.

## 2025-06-13 NOTE — ED PROVIDER NOTE - CARE PROVIDER_API CALL
Viraj Estes  Orthopaedic Surgery  [address] [place] [address]  Phone: [phone]  Fax: [phone]  Follow Up Time:

## 2025-06-13 NOTE — ED PROVIDER NOTE - WR INTERPRETED BY 1
Call back about visit on 12/27/2018-Pt states she is doing much better, and the medication worked very well.   Keith Gutierrez

## 2025-06-13 NOTE — ED ADULT TRIAGE NOTE - CHIEF COMPLAINT QUOTE
pt tripped and felt  about 3 hours ago c/o lt thumb pain seen at [facility] and sent to Ed no loc not on blood thinner pt ambulating with steady gait

## 2025-06-13 NOTE — ED PROVIDER NOTE - CLINICAL SUMMARY MEDICAL DECISION MAKING FREE TEXT BOX
84-year-old female history of hyperlipidemia hypertension hypothyroidism osteoarthritis depression sent from urgent care status post mechanical fall outside her daughter's house landed on her hands complaining of left thenar eminence thumb pain and was not sure if she hit her head.  States tetanus is up-to-date.  States she was wearing glasses and it broke.  Was sent in by urgent care for CT head.  Received Tylenol prior to arrival while at the urgent care.    r/o ich r/o fx, CT head, Xray hand, tdap utd

## 2025-06-13 NOTE — ED ADULT NURSE NOTE - OBJECTIVE STATEMENT
Pt. received alert and oriented x4 with chief complaint of trip and fall outside daughters home hurting left and right hands and hitting head. Pt. denies LOC.

## 2025-06-13 NOTE — ED PROVIDER NOTE - NSICDXPASTMEDICALHX_GEN_ALL_CORE_FT
PAST MEDICAL HISTORY:  Depression     HLD (hyperlipidemia)     HTN (hypertension)     Hypothyroidism     Osteoarthritis     Ovarian cancer

## 2025-06-13 NOTE — ED PROVIDER NOTE - OBJECTIVE STATEMENT
84-year-old female history of hyperlipidemia hypertension hypothyroidism osteoarthritis depression sent from urgent care status post mechanical fall outside her daughter's house landed on her hands complaining of left thenar eminence thumb pain and was not sure if she hit her head.  States tetanus is up-to-date.  States she was wearing glasses and it broke.  Was sent in by urgent care for CT head.  Received Tylenol prior to arrival while at the urgent care.

## 2025-06-14 VITALS
TEMPERATURE: 98 F | HEART RATE: 61 BPM | OXYGEN SATURATION: 95 % | SYSTOLIC BLOOD PRESSURE: 145 MMHG | RESPIRATION RATE: 18 BRPM | DIASTOLIC BLOOD PRESSURE: 74 MMHG